# Patient Record
Sex: FEMALE | Race: WHITE
[De-identification: names, ages, dates, MRNs, and addresses within clinical notes are randomized per-mention and may not be internally consistent; named-entity substitution may affect disease eponyms.]

---

## 2023-03-13 LAB
APPEARANCE FLD: (no result)
APPEARANCE SPUN FLD: CLEAR
BODY FLD TYPE: (no result)
COLOR FLD: (no result)
CRYSTALS SNV MICRO: (no result)
LYMPHOCYTES NFR FLD MANUAL: 53 %
NEUTROPHILS NFR FLD MANUAL: 47 %
RBC # FLD MANUAL: 2261 /UL
SPECIMEN VOL FLD: 40 ML
WBC # FLD MANUAL: 266 /UL

## 2023-03-17 ENCOUNTER — HOSPITAL ENCOUNTER (INPATIENT)
Dept: HOSPITAL 4 - SDS | Age: 78
LOS: 1 days | Discharge: HOME HEALTH | DRG: 467 | End: 2023-03-18
Attending: STUDENT IN AN ORGANIZED HEALTH CARE EDUCATION/TRAINING PROGRAM | Admitting: STUDENT IN AN ORGANIZED HEALTH CARE EDUCATION/TRAINING PROGRAM
Payer: COMMERCIAL

## 2023-03-17 VITALS — SYSTOLIC BLOOD PRESSURE: 131 MMHG

## 2023-03-17 VITALS — HEIGHT: 59 IN | WEIGHT: 147.13 LBS | BODY MASS INDEX: 29.66 KG/M2

## 2023-03-17 VITALS — SYSTOLIC BLOOD PRESSURE: 141 MMHG

## 2023-03-17 DIAGNOSIS — E44.1: ICD-10-CM

## 2023-03-17 DIAGNOSIS — T84.093A: Primary | ICD-10-CM
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
## 2023-03-17 DIAGNOSIS — Z20.822: ICD-10-CM

## 2023-03-17 PROCEDURE — 0SPD0JZ REMOVAL OF SYNTHETIC SUBSTITUTE FROM LEFT KNEE JOINT, OPEN APPROACH: ICD-10-PCS | Performed by: STUDENT IN AN ORGANIZED HEALTH CARE EDUCATION/TRAINING PROGRAM

## 2023-03-17 PROCEDURE — C1713 ANCHOR/SCREW BN/BN,TIS/BN: HCPCS

## 2023-03-17 PROCEDURE — 0SRD0J9 REPLACEMENT OF LEFT KNEE JOINT WITH SYNTHETIC SUBSTITUTE, CEMENTED, OPEN APPROACH: ICD-10-PCS | Performed by: STUDENT IN AN ORGANIZED HEALTH CARE EDUCATION/TRAINING PROGRAM

## 2023-03-17 PROCEDURE — C1776 JOINT DEVICE (IMPLANTABLE): HCPCS

## 2023-03-17 PROCEDURE — C9290 INJ, BUPIVACAINE LIPOSOME: HCPCS

## 2023-03-17 PROCEDURE — 0QBH0ZZ EXCISION OF LEFT TIBIA, OPEN APPROACH: ICD-10-PCS | Performed by: STUDENT IN AN ORGANIZED HEALTH CARE EDUCATION/TRAINING PROGRAM

## 2023-03-17 PROCEDURE — A4649 SURGICAL SUPPLIES: HCPCS

## 2023-03-17 RX ADMIN — GABAPENTIN SCH MG: 300 CAPSULE ORAL at 21:02

## 2023-03-17 RX ADMIN — HYDROMORPHONE HYDROCHLORIDE PRN MG: 1 INJECTION, SOLUTION INTRAMUSCULAR; INTRAVENOUS; SUBCUTANEOUS at 21:02

## 2023-03-17 RX ADMIN — HYDROMORPHONE HYDROCHLORIDE SCH MG: 8 TABLET ORAL at 14:00

## 2023-03-17 RX ADMIN — DEXTROSE MONOHYDRATE SCH MLS/HR: 50 INJECTION, SOLUTION INTRAVENOUS at 17:16

## 2023-03-17 RX ADMIN — KETOROLAC TROMETHAMINE SCH MG: 10 TABLET, FILM COATED ORAL at 23:25

## 2023-03-17 RX ADMIN — KETOROLAC TROMETHAMINE SCH MG: 10 TABLET, FILM COATED ORAL at 14:00

## 2023-03-17 RX ADMIN — HYDROMORPHONE HYDROCHLORIDE SCH MG: 8 TABLET ORAL at 23:24

## 2023-03-17 RX ADMIN — SENNOSIDES AND DOCUSATE SODIUM SCH TAB: 8.6; 5 TABLET ORAL at 21:02

## 2023-03-17 NOTE — NUR
ROUNDS

LATE ENTRY DUE TO PT CARE

1700- PHYSICAL THERAPIST AT THE BEDSIDE. AMBULATED 10 FEET. 

1800- PATIENT COMFORTABLE AT THIS TIME

1920- BEDSIDE REPORT GIVEN TO PENNY GOODRICH

## 2023-03-17 NOTE — NUR
Admission Note

Received patient from PACU sp left knee arthroplasty. AAOx4. bp124/78, hr67, t97.8, rr 18 O2 
sat 98% room air. Polar pack in plcae, left heal elevated on 1 pillow. denies pain at this 
time. instructed use od incentive spirometer.

Initial Plan of Care discussed-patient verbalized understanding.Oriented to room, call 
light, pain management and safety.

## 2023-03-18 VITALS — SYSTOLIC BLOOD PRESSURE: 122 MMHG

## 2023-03-18 VITALS — SYSTOLIC BLOOD PRESSURE: 131 MMHG

## 2023-03-18 VITALS — SYSTOLIC BLOOD PRESSURE: 128 MMHG

## 2023-03-18 VITALS — SYSTOLIC BLOOD PRESSURE: 129 MMHG

## 2023-03-18 LAB
ALBUMIN SERPL BCP-MCNC: 2.6 G/DL (ref 3.4–4.8)
ALT SERPL W P-5'-P-CCNC: 18 U/L (ref 12–78)
ANION GAP SERPL CALCULATED.3IONS-SCNC: 7 MMOL/L (ref 5–15)
AST SERPL W P-5'-P-CCNC: 26 U/L (ref 10–37)
BASOPHILS # BLD AUTO: 0 K/UL (ref 0–0.2)
BASOPHILS NFR BLD AUTO: 0.3 % (ref 0–2)
BILIRUB SERPL-MCNC: 0.4 MG/DL (ref 0–1)
BUN SERPL-MCNC: 14 MG/DL (ref 8–21)
CALCIUM SERPL-MCNC: 8.3 MG/DL (ref 8.4–11)
CHLORIDE SERPL-SCNC: 106 MMOL/L (ref 98–107)
CREAT SERPL-MCNC: 0.8 MG/DL (ref 0.55–1.3)
EOSINOPHIL # BLD AUTO: 0 K/UL (ref 0–0.4)
EOSINOPHIL NFR BLD AUTO: 0.6 % (ref 0–4)
ERYTHROCYTE [DISTWIDTH] IN BLOOD BY AUTOMATED COUNT: 13.5 % (ref 9–15)
GFR SERPL CREATININE-BSD FRML MDRD: (no result) ML/MIN (ref 90–?)
GLUCOSE SERPL-MCNC: 88 MG/DL (ref 70–99)
HCT VFR BLD AUTO: 24.8 % (ref 36–48)
HGB BLD-MCNC: 8.6 G/DL (ref 12–16)
LYMPHOCYTES # BLD AUTO: 0.9 K/UL (ref 1–5.5)
LYMPHOCYTES NFR BLD AUTO: 15.1 % (ref 20.5–51.5)
MCH RBC QN AUTO: 41 PG (ref 27–31)
MCHC RBC AUTO-ENTMCNC: 35 % (ref 32–36)
MCV RBC AUTO: 119 FL (ref 79–98)
MONOCYTES # BLD MANUAL: 0.5 K/UL (ref 0–1)
MONOCYTES # BLD MANUAL: 8.2 % (ref 1.7–9.3)
NEUTROPHILS # BLD AUTO: 4.5 K/UL (ref 1.8–7.7)
NEUTROPHILS NFR BLD AUTO: 75.8 % (ref 40–70)
PLATELET # BLD AUTO: 380 K/UL (ref 130–430)
RBC # BLD AUTO: 2.09 MIL/UL (ref 4.2–6.2)
WBC # BLD AUTO: 5.9 K/UL (ref 4.8–10.8)

## 2023-03-18 RX ADMIN — DEXTROSE MONOHYDRATE SCH MLS/HR: 50 INJECTION, SOLUTION INTRAVENOUS at 00:57

## 2023-03-18 RX ADMIN — HYDROMORPHONE HYDROCHLORIDE SCH MG: 8 TABLET ORAL at 06:05

## 2023-03-18 RX ADMIN — GABAPENTIN SCH MG: 300 CAPSULE ORAL at 10:14

## 2023-03-18 RX ADMIN — HYDROMORPHONE HYDROCHLORIDE SCH MG: 8 TABLET ORAL at 14:57

## 2023-03-18 RX ADMIN — SENNOSIDES AND DOCUSATE SODIUM SCH TAB: 8.6; 5 TABLET ORAL at 10:21

## 2023-03-18 RX ADMIN — HYDROMORPHONE HYDROCHLORIDE PRN MG: 1 INJECTION, SOLUTION INTRAMUSCULAR; INTRAVENOUS; SUBCUTANEOUS at 00:51

## 2023-03-18 RX ADMIN — DEXTROSE MONOHYDRATE SCH MLS/HR: 50 INJECTION, SOLUTION INTRAVENOUS at 10:42

## 2023-03-18 RX ADMIN — KETOROLAC TROMETHAMINE SCH MG: 10 TABLET, FILM COATED ORAL at 06:05

## 2023-03-18 NOTE — NUR
D/C Patient

Patient given medication reconciliation form and D/C instructions. Exit Care provided. 
Patient verbalized understanding. MD discussed with patient the results and treatment 
provided. Patient in stable condition, ID band removed. IV catheter removed, intact and 
dressing applied, no active bleeding.  Patient educated on pain management. All belongings 
sent with patient. Patient left with FWW. Patient left floor via wheelchair to private 
vehicle in no distress.

## 2023-03-18 NOTE — NUR
***** PHYSICAL THERAPY CO-SIGN *****

The Physical Therapy Progress Notes documented by Physical Therapy Assistant have been 
reviewed.



Reviewed/Co-Signed by:  Austin Morrison

Documentation Done by:HANDY VALERIO

-------------------------------------------------------------------------------

Addendum: 03/18/23 at 1524 by Austin Morrison PT

-------------------------------------------------------------------------------

Amended: Links added.

## 2023-03-18 NOTE — NUR
CM: reveived call from idania/Shailesh # [phone], c/o unable to get in touch with CM at [facility] 
since this am. Pt. is dc home with FWW a which Eusebio, yesterday CM said , would get one sent 
to the room, and home health /PT info. So far , he has not received any info. I called the 
after hour # and spoke with Dyan, care coordinator/[facility] # [phone] , informed of the 
issues above. Said the home health is arranged with [facility]  # [phone], FWW 
provided from [facility] and [facility] will replace it by today . The FWW order faxed to 
#[phone] zari Guerrero 


-------------------------------------------------------------------------------

Addendum: 03/18/23 at 1555 by Adis Santiago RN

-------------------------------------------------------------------------------

Correct phone # Angel # [phone], I informed  set up  and FWW is at bedside. Son 
does not want to wait for [facility] to deliver FWW late this pm. 

-------------------------------------------------------------------------------

Addendum: 03/18/23 at 1622 by Adis Santiago RN

-------------------------------------------------------------------------------

Called back from [facility]# [phone]:  the DME co is trying to meet the delivery time by 6 
pm.  Otherwise, they will  delivery the FWW to [facility]  on Monday 3/20.

## 2023-03-18 NOTE — NUR
HCP/HOME HEALTH

Spoke with Bryce  from [facility] [phone] and informed him of the order for 
Home Health for Physical Therapy. He stated okay to send pt home once the MD Order is faxed 
to [facility]-done per Bertin  here at [facility]. He stated home health will follow up 
with pt in 48 hours.

## 2023-03-30 NOTE — NUR
MSW made a post discharge follow up phone call to pt., but spoke to son Shailesh  who stated 
his mom Janice is doing well. She was discharged on 3/18 but due to fainting at home the 
morning of 3/19 was taken via ambulance to [facility] for one day. Family left not knowing 
what her Dx. was. 



Shailesh stated he was so happy with the staff at [facility] and Dr. Mcleod and his associate  and 
thanked MSW.  Janice is getting home PT and will be getting outpt. PT , has had a follow up 
apt. with Dr. Mcleod and will have a follow up apt. with her PCP.